# Patient Record
Sex: FEMALE | Race: WHITE | URBAN - METROPOLITAN AREA
[De-identification: names, ages, dates, MRNs, and addresses within clinical notes are randomized per-mention and may not be internally consistent; named-entity substitution may affect disease eponyms.]

---

## 2022-03-02 SDOH — HEALTH STABILITY: PHYSICAL HEALTH: ON AVERAGE, HOW MANY MINUTES DO YOU ENGAGE IN EXERCISE AT THIS LEVEL?: 60 MIN

## 2022-03-02 SDOH — HEALTH STABILITY: PHYSICAL HEALTH: ON AVERAGE, HOW MANY DAYS PER WEEK DO YOU ENGAGE IN MODERATE TO STRENUOUS EXERCISE (LIKE A BRISK WALK)?: 3 DAYS

## 2022-03-02 ASSESSMENT — SOCIAL DETERMINANTS OF HEALTH (SDOH)
WITHIN THE LAST YEAR, HAVE TO BEEN RAPED OR FORCED TO HAVE ANY KIND OF SEXUAL ACTIVITY BY YOUR PARTNER OR EX-PARTNER?: PATIENT DECLINED
WITHIN THE LAST YEAR, HAVE YOU BEEN AFRAID OF YOUR PARTNER OR EX-PARTNER?: NO
WITHIN THE LAST YEAR, HAVE YOU BEEN HUMILIATED OR EMOTIONALLY ABUSED IN OTHER WAYS BY YOUR PARTNER OR EX-PARTNER?: NO
WITHIN THE LAST YEAR, HAVE YOU BEEN KICKED, HIT, SLAPPED, OR OTHERWISE PHYSICALLY HURT BY YOUR PARTNER OR EX-PARTNER?: NO

## 2022-03-03 ENCOUNTER — OFFICE VISIT (OUTPATIENT)
Dept: PRIMARY CARE CLINIC | Age: 27
End: 2022-03-03
Payer: COMMERCIAL

## 2022-03-03 VITALS
HEART RATE: 91 BPM | SYSTOLIC BLOOD PRESSURE: 127 MMHG | HEIGHT: 63 IN | DIASTOLIC BLOOD PRESSURE: 79 MMHG | TEMPERATURE: 98.4 F | WEIGHT: 156.4 LBS | BODY MASS INDEX: 27.71 KG/M2

## 2022-03-03 DIAGNOSIS — F88 SENSORY PROCESSING DIFFICULTY: ICD-10-CM

## 2022-03-03 DIAGNOSIS — Z00.00 ANNUAL PHYSICAL EXAM: Primary | ICD-10-CM

## 2022-03-03 DIAGNOSIS — F90.2 ATTENTION DEFICIT HYPERACTIVITY DISORDER (ADHD), COMBINED TYPE: ICD-10-CM

## 2022-03-03 DIAGNOSIS — R48.0 DYSLEXIA: ICD-10-CM

## 2022-03-03 DIAGNOSIS — Z80.0 FAMILY HISTORY OF COLON CANCER IN MOTHER: ICD-10-CM

## 2022-03-03 DIAGNOSIS — K58.1 IRRITABLE BOWEL SYNDROME WITH CONSTIPATION: ICD-10-CM

## 2022-03-03 DIAGNOSIS — F41.1 GAD (GENERALIZED ANXIETY DISORDER): ICD-10-CM

## 2022-03-03 DIAGNOSIS — F51.01 PRIMARY INSOMNIA: ICD-10-CM

## 2022-03-03 PROCEDURE — 99385 PREV VISIT NEW AGE 18-39: CPT | Performed by: STUDENT IN AN ORGANIZED HEALTH CARE EDUCATION/TRAINING PROGRAM

## 2022-03-03 PROCEDURE — 99204 OFFICE O/P NEW MOD 45 MIN: CPT | Performed by: STUDENT IN AN ORGANIZED HEALTH CARE EDUCATION/TRAINING PROGRAM

## 2022-03-03 RX ORDER — METHYLPHENIDATE HYDROCHLORIDE 40 MG/1
40 CAPSULE, EXTENDED RELEASE ORAL DAILY
Qty: 30 CAPSULE | Refills: 0
Start: 2022-03-03 | End: 2022-04-02

## 2022-03-03 RX ORDER — TEMAZEPAM 7.5 MG/1
7.5 CAPSULE ORAL NIGHTLY
COMMUNITY
End: 2022-03-03 | Stop reason: SDUPTHER

## 2022-03-03 RX ORDER — LUBIPROSTONE 8 UG/1
CAPSULE, GELATIN COATED ORAL
COMMUNITY
Start: 2021-05-28

## 2022-03-03 RX ORDER — TEMAZEPAM 7.5 MG/1
7.5 CAPSULE ORAL NIGHTLY
Qty: 30 CAPSULE | Refills: 2 | Status: SHIPPED | OUTPATIENT
Start: 2022-03-03 | End: 2022-06-01

## 2022-03-03 ASSESSMENT — ENCOUNTER SYMPTOMS
CHEST TIGHTNESS: 0
CONSTIPATION: 0
SHORTNESS OF BREATH: 0
NAUSEA: 0
ABDOMINAL PAIN: 0

## 2022-03-03 ASSESSMENT — PATIENT HEALTH QUESTIONNAIRE - PHQ9
SUM OF ALL RESPONSES TO PHQ QUESTIONS 1-9: 0
2. FEELING DOWN, DEPRESSED OR HOPELESS: 0
1. LITTLE INTEREST OR PLEASURE IN DOING THINGS: 0
SUM OF ALL RESPONSES TO PHQ9 QUESTIONS 1 & 2: 0

## 2022-03-03 NOTE — PATIENT INSTRUCTIONS
within about 15 to 20 minutes, get out of bed and go to another room until you feel sleepy. ? If watching the clock makes you anxious, turn it facing away from you so you cannot see the time. ? If you worry when you lie down, start a worry book. Well before bedtime, write down your worries, and then set the book and your concerns aside. Healthy sleep habits  · If your doctor recommends it, try making healthy sleep habits. For example:  ? Keep your bedroom quiet, dark, and cool. ? Do not have drinks with caffeine, such as coffee or black tea, for 8 hours before bed. ? Do not smoke or use other types of tobacco near bedtime. Nicotine is a stimulant and can keep you awake. ? Avoid drinking alcohol late in the evening, because it can cause you to wake in the middle of the night. ? Do not eat a big meal close to bedtime. If you are hungry, eat a light snack. ? Do not drink a lot of water close to bedtime, because the need to urinate may wake you up during the night. ? Do not read, watch TV, or use your phone in bed. Use the bed only for sleeping and sex. Medicine  · Be safe with medicines. Take your medicines exactly as prescribed. Call your doctor if you think you are having a problem with your medicine. · Talk with your doctor before you try an over-the-counter medicine, herbal product, or supplement to try to improve your sleep. Your doctor can recommend how much to take and when to take it. Make sure your doctor knows all of the medicines, vitamins, herbal products, and supplements you take. · You will get more details on the specific medicines your doctor prescribes. When should you call for help? Watch closely for changes in your health, and be sure to contact your doctor if:    · Your efforts to improve your sleep do not work.     · Your insomnia gets worse.     · You have been feeling down, depressed, or hopeless or have lost interest in things that you usually enjoy. Where can you learn more?   Go to [email or web address] and sign in to your [email or web address] account. Enter P513 in the [place] box to learn more about \"Insomnia: Care Instructions. \"     If you do not have an account, please click on the \"Sign Up Now\" link. Current as of: June 16, 2021               Content Version: 13.1  © 0764-0131 [employer]. Care instructions adapted under license by [facility] ([facility]). If you have questions about a medical condition or this instruction, always ask your healthcare professional. [address] any warranty or liability for your use of this information.

## 2022-03-03 NOTE — PROGRESS NOTES
[facility]  Establish care visit   3/3/2022    Pramod Denis (:  1995) is a 31 y.o. female, here to establish care. Chief Complaint   Patient presents with   [address] Other     wants to see Tyree Mazariegos         ASSESSMENT/ PLAN  1. Annual physical exam  Screening labs ordered, Pap smear at follow-up appointment. Recommend Tdap vaccine. Continue 150 minutes of exercise weekly and healthy dietary choices. - CBC with Auto Differential; Future  - Comprehensive Metabolic Panel; Future  - Hemoglobin A1C; Future  - Lipid Panel; Future  - TSH with Reflex; Future  - Hepatitis C Antibody; Future  - HIV Screen; Future    2. Attention deficit hyperactivity disorder (ADHD), combined type  Controlled, continue Ritalin. Patient declines psychotherapy today, referral for psychiatry  - methylphenidate (RITALIN LA) 40 MG extended release capsule; Take 1 capsule by mouth daily for 30 days. Dispense: 30 capsule; Refill: 0    3. DHRUV (generalized anxiety disorder)  Controlled, continue Restoril and Zoloft  - temazepam (RESTORIL) 7.5 MG capsule; Take 1 capsule by mouth nightly for 90 days. Dispense: 30 capsule; Refill: 2    4. Sensory processing difficulty  5. Dyslexia  Controlled    6. Family history of colon cancer in mother  Continue cancer screening per GI recommendations    7. Primary insomnia  Controlled, continue Restoril  - temazepam (RESTORIL) 7.5 MG capsule; Take 1 capsule by mouth nightly for 90 days. Dispense: 30 capsule; Refill: 2    8. IBS -C  Controlled, continue Amitiza    Return in about 3 months (around 6/3/2022) for pap smear, medication management . HPI  Patient presents today to establish care, medication refill and referral to Dr. Tayla Meza. Patient states that she has recently moved from [address] [place] [address]. She is looking to establish with psychiatry as she has been diagnosed with ADHD, DHRUV, sensory processing difficulty, dyslexia, insomnia.   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 Patient states that she has had numerous health issues due to this, including nasal septum surgery, inner ear surgery, clubfoot surgical correction in addition to her psychiatric disorders. Overall, she states that her disorders are well controlled on her current medication regimen. She needs refill of her Restoril today. Patient lives at home by herself. She works as a  for commercial construction. She has never had a Pap smear before. She is screened yearly for colon cancer as her mother was diagnosed in her 35s. Exercise includes barre and running. She endorses a healthy, mostly keto diet with intermittent fasting. ROS  Review of Systems   Constitutional: Negative for activity change, appetite change, fatigue and unexpected weight change. Respiratory: Negative for chest tightness and shortness of breath. Cardiovascular: Negative for palpitations. Gastrointestinal: Negative for abdominal pain, constipation and nausea. Neurological: Negative for headaches. Psychiatric/Behavioral: Negative for agitation, decreased concentration, self-injury, sleep disturbance and suicidal ideas. The patient is not nervous/anxious. HISTORIES  Current Outpatient Medications on File Prior to Visit   Medication Sig Dispense Refill    sertraline (ZOLOFT) 50 MG tablet Take 50 mg by mouth daily      lubiprostone (AMITIZA) 8 MCG CAPS capsule TAKES 2 CAPULES EVERY 12 HOURS      METHYLPHENIDATE PO        No current facility-administered medications on file prior to visit. Allergies   Allergen Reactions    Other     Adhesive Tape Rash    Codeine Other (See Comments) and Seizure     hyper  hyper         History reviewed. No pertinent past medical history.     Patient Active Problem List   Diagnosis    Dyslexia    Sensory processing difficulty    DHRUV (generalized anxiety disorder)    Attention deficit hyperactivity disorder (ADHD), combined type    Family history of colon cancer in mother    Primary insomnia       Past Surgical History:   Procedure Laterality Date    NOSE SURGERY      X-RAY FOOT 3+VW         Social History     Socioeconomic History    Marital status: Single     Spouse name: Not on file    Number of children: Not on file    Years of education: Not on file    Highest education level: Not on file   Occupational History     Comment:  - [address] [place] [address],[address] [employer] (construction, plumbing)   Tobacco Use    Smoking status: Never Smoker    Smokeless tobacco: Never Used   Vaping Use    Vaping Use: Never used   Substance and Sexual Activity    Alcohol use: Yes     Comment: rarely    Drug use: Never    Sexual activity: Not Currently     Partners: Male     Birth control/protection: None   Other Topics Concern    Not on file   Social History Narrative    Lives at home by herself      Social Determinants of Health     Financial Resource Strain:     Difficulty of Paying Living Expenses: Not on file   Food Insecurity:     Worried About [address] in the Last Year: Not on file    Jake of Food in the Last Year: Not on file   Transportation Needs:     Lack of Transportation (Medical): Not on file    Lack of Transportation (Non-Medical):  Not on file   Physical Activity: Sufficiently Active    Days of Exercise per Week: 3 days    Minutes of Exercise per Session: 60 min   Stress:     Feeling of Stress : Not on file   Social Connections:     Frequency of Communication with Friends and Family: Not on file    Frequency of Social Gatherings with Friends and Family: Not on file    Attends Orthodox Services: Not on file    Active Member of Clubs or Organizations: Not on file    Attends Club or Organization Meetings: Not on file    Marital Status: Not on file   Intimate Partner Violence: Unknown    Fear of Current or Ex-Partner: No    Emotionally Abused: No    Physically Abused: No    Sexually Abused: Patient refused   Housing Stability:     Unable to Pay for Housing in the Last Year: Not on file    Number of Places Lived in the Last Year: Not on file    Unstable Housing in the Last Year: Not on file        Family History   Problem Relation Age of Onset    Other Mother     Cancer Mother         Colon CA    Diabetes Paternal Grandmother     Cancer Maternal Aunt         Breast CA    Cancer Paternal Aunt         Breast CA       PE  Vitals:    03/03/22 1516   BP: 127/79   Site: Left Upper Arm   Position: Sitting   Cuff Size: Medium Adult   Pulse: 91   Temp: 98.4 °F (36.9 °C)   TempSrc: Temporal   Weight: 156 lb 6.4 oz (70.9 kg)   Height: 5' 3\" (1.6 m)     Estimated body mass index is 27.71 kg/m² as calculated from the following:    Height as of this encounter: 5' 3\" (1.6 m). Weight as of this encounter: 156 lb 6.4 oz (70.9 kg). Physical Exam  Vitals reviewed. Constitutional:       General: She is not in acute distress. Appearance: Normal appearance. HENT:      Head: Normocephalic and atraumatic. Right Ear: Tympanic membrane normal.      Left Ear: Tympanic membrane normal.      Nose: Nose normal.      Mouth/Throat:      Mouth: Mucous membranes are moist.   Eyes:      Extraocular Movements: Extraocular movements intact. Pupils: Pupils are equal, round, and reactive to light. Cardiovascular:      Rate and Rhythm: Normal rate and regular rhythm. Pulses: Normal pulses. Heart sounds: Normal heart sounds. Pulmonary:      Effort: Pulmonary effort is normal.      Breath sounds: Normal breath sounds. Abdominal:      General: Abdomen is flat. Bowel sounds are normal.      Palpations: Abdomen is soft. Neurological:      Mental Status: She is alert. Psychiatric:         Mood and Affect: Mood normal.         Behavior: Behavior normal.         Thought Content:  Thought content normal.